# Patient Record
Sex: FEMALE | Race: WHITE | NOT HISPANIC OR LATINO | Employment: FULL TIME | URBAN - METROPOLITAN AREA
[De-identification: names, ages, dates, MRNs, and addresses within clinical notes are randomized per-mention and may not be internally consistent; named-entity substitution may affect disease eponyms.]

---

## 2017-08-30 ENCOUNTER — APPOINTMENT (OUTPATIENT)
Dept: WOMENS IMAGING | Facility: HOSPITAL | Age: 47
End: 2017-08-30

## 2017-08-30 PROCEDURE — G0202 SCR MAMMO BI INCL CAD: HCPCS | Performed by: RADIOLOGY

## 2017-08-30 PROCEDURE — 77067 SCR MAMMO BI INCL CAD: CPT | Performed by: RADIOLOGY

## 2017-08-30 PROCEDURE — MDREVIEWSP: Performed by: RADIOLOGY

## 2017-08-30 PROCEDURE — 77063 BREAST TOMOSYNTHESIS BI: CPT | Performed by: RADIOLOGY

## 2018-11-20 ENCOUNTER — APPOINTMENT (OUTPATIENT)
Dept: WOMENS IMAGING | Facility: HOSPITAL | Age: 48
End: 2018-11-20

## 2018-11-20 PROCEDURE — 77067 SCR MAMMO BI INCL CAD: CPT | Performed by: RADIOLOGY

## 2018-11-20 PROCEDURE — MDREVIEWSP: Performed by: RADIOLOGY

## 2018-11-20 PROCEDURE — 77063 BREAST TOMOSYNTHESIS BI: CPT | Performed by: RADIOLOGY

## 2019-12-16 ENCOUNTER — APPOINTMENT (OUTPATIENT)
Dept: WOMENS IMAGING | Facility: HOSPITAL | Age: 49
End: 2019-12-16

## 2019-12-16 PROCEDURE — 77067 SCR MAMMO BI INCL CAD: CPT | Performed by: RADIOLOGY

## 2019-12-16 PROCEDURE — MDREVIEWSP: Performed by: RADIOLOGY

## 2019-12-16 PROCEDURE — 77063 BREAST TOMOSYNTHESIS BI: CPT | Performed by: RADIOLOGY

## 2022-06-08 ENCOUNTER — TELEPHONE (OUTPATIENT)
Dept: NEUROSURGERY | Facility: CLINIC | Age: 52
End: 2022-06-08

## 2022-06-08 ENCOUNTER — OFFICE VISIT (OUTPATIENT)
Dept: NEUROSURGERY | Facility: CLINIC | Age: 52
End: 2022-06-08

## 2022-06-08 VITALS
HEART RATE: 82 BPM | HEIGHT: 66 IN | SYSTOLIC BLOOD PRESSURE: 132 MMHG | DIASTOLIC BLOOD PRESSURE: 90 MMHG | WEIGHT: 226 LBS | BODY MASS INDEX: 36.32 KG/M2 | OXYGEN SATURATION: 98 % | TEMPERATURE: 97.9 F

## 2022-06-08 DIAGNOSIS — M54.41 ACUTE RIGHT-SIDED LOW BACK PAIN WITH RIGHT-SIDED SCIATICA: Primary | ICD-10-CM

## 2022-06-08 DIAGNOSIS — M25.551 RIGHT HIP PAIN: ICD-10-CM

## 2022-06-08 PROCEDURE — 99204 OFFICE O/P NEW MOD 45 MIN: CPT | Performed by: NURSE PRACTITIONER

## 2022-06-08 RX ORDER — ZOLPIDEM TARTRATE 5 MG/1
5-10 TABLET ORAL
COMMUNITY

## 2022-06-08 RX ORDER — METHOCARBAMOL 750 MG/1
TABLET, FILM COATED ORAL
COMMUNITY
Start: 2022-05-29

## 2022-06-08 RX ORDER — LIDOCAINE 50 MG/G
2 PATCH TOPICAL EVERY 24 HOURS
Qty: 60 PATCH | Refills: 1 | Status: SHIPPED | OUTPATIENT
Start: 2022-06-08

## 2022-06-08 RX ORDER — LISINOPRIL 5 MG/1
TABLET ORAL
COMMUNITY
Start: 2022-05-31

## 2022-06-08 RX ORDER — HYDROCODONE BITARTRATE AND ACETAMINOPHEN 10; 325 MG/1; MG/1
TABLET ORAL
COMMUNITY
Start: 2022-06-06

## 2022-06-08 RX ORDER — ROSUVASTATIN CALCIUM 10 MG/1
10 TABLET, COATED ORAL DAILY
COMMUNITY
Start: 2022-01-03

## 2022-06-08 RX ORDER — SEMAGLUTIDE 2.4 MG/.75ML
INJECTION, SOLUTION SUBCUTANEOUS
COMMUNITY
Start: 2022-06-02

## 2022-06-08 RX ORDER — SEMAGLUTIDE 1.7 MG/.75ML
INJECTION, SOLUTION SUBCUTANEOUS
COMMUNITY
Start: 2022-05-21

## 2022-06-08 NOTE — TELEPHONE ENCOUNTER
PT SAW TUCKER IN OFFICE 6/8/22 AND PER TUCKER Return in about 1 month (around 7/8/2022) for Follow-up with [facility] FOLLOWING PT AND XR.

## 2022-06-08 NOTE — PROGRESS NOTES
Subjective   Patient ID: Ana Trotter is a 51 y.o. female is being seen for consultation today at the request of Milvia Garcia PA-C for back pain. She had ACDF with Dr. Sevilla in 2014.     History of Present Illness  She was evaluated last year by Dr. Contreras as well as PAZoey at [facility].  Physical therapy, injection therapy, and surgery were discussed.      She reports chronic low back pain. Last year had increase in LBP with pain into left leg. Went on trip to [place] and took MDP. Did PT and returned to baseline over time. She reports about two weeks ago she had a flare up of low back pain brought on by work out. 2 nights ago while doing some stretches, her pain was worsened. Entire right leg ached yesterday. Warm bath, nightly meds and some better today but still with pain in right buttock, lateral hip, groin, and into the anterior thigh. No left leg symptoms. She reports she is having some difficulty walking long distances due to pain. She reports sitting and leaning forward makes the pain better, but laying down makes it feel the best. She reports standing and walking makes the pain the worst. She reports some numbness and tingling in her shin and spasms in her thigh, usually with walking long distances. She denies any bowel/bladder incontinence or weakness. She takes Robaxin PRN and is taking hydrocodone at night time before bed.  These are prescribed by her PCP.  She did go to physical therapy last year and does not know if it helped but would be willing to go back to therapy at a different place. No prior lumbar surgery. Non smoker. She is on Wegovy- weight loss medication. She has been on for 3 months- 8-9# wt loss.     The following portions of the patient's history were reviewed and updated as appropriate: allergies, current medications, past family history, past medical history, past social history, past surgical history and problem list.    Review of Systems  "  Constitutional: Positive for activity change.   HENT: Negative for congestion.    Eyes: Negative for visual disturbance.   Respiratory: Negative for chest tightness and shortness of breath.    Cardiovascular: Negative for chest pain.   Gastrointestinal: Negative for nausea and vomiting.   Endocrine: Negative for cold intolerance and heat intolerance.   Genitourinary: Negative for difficulty urinating.   Musculoskeletal: Positive for back pain.   Skin: Negative for rash and wound.   Allergic/Immunologic: Negative for environmental allergies.   Neurological: Positive for numbness. Negative for weakness.   Hematological: Does not bruise/bleed easily.   Psychiatric/Behavioral: Positive for sleep disturbance.     Objective     Vitals:    06/08/22 1444   BP: 132/90   Pulse: 82   Temp: 97.9 °F (36.6 °C)   SpO2: 98%   Weight: 103 kg (226 lb)   Height: 166.4 cm (65.5\")     Body mass index is 37.04 kg/m².      Physical Exam  Vitals reviewed.   Constitutional:       Appearance: Normal appearance.      Comments: Body mass index is 37.04 kg/m².     Pulmonary:      Effort: Pulmonary effort is normal.   Musculoskeletal:      Lumbar back: Tenderness ( lower buttock; not much at SI joint) present. No bony tenderness. Negative right straight leg raise test and negative left straight leg raise test.      Right hip: Tenderness ( lateral) present. Normal range of motion.      Comments:   SI joint testing:  Negative YAZ, Gaenslen, thigh thrust, compression, distraction; has pain in anterior thigh with Gaenslen and lateral hip and groin with YAZ   Neurological:      General: No focal deficit present.      Mental Status: She is alert.      Deep Tendon Reflexes:      Reflex Scores:       Patellar reflexes are 1+ on the right side and 2+ on the left side.       Achilles reflexes are 2+ on the right side and 2+ on the left side.  Psychiatric:         Mood and Affect: Mood normal.         Speech: Speech normal.       Neurologic Exam "     Mental Status   Speech: speech is normal   Level of consciousness: alert  Knowledge: good.   Normal comprehension.     Motor Exam   Muscle bulk: normal  Overall muscle tone: normal  5/5 angie LEs     Sensory Exam   Right leg light touch: normal  Left leg light touch: normal    Gait, Coordination, and Reflexes     Gait  Gait: (forward flexed)    Reflexes   Right patellar: 1+  Left patellar: 2+  Right achilles: 2+  Left achilles: 2+  Right ankle clonus: absent  Left ankle clonus: absent      Assessment & Plan   Independent Review of Radiographic Studies:      I personally reviewed the images from the following studies.    MRI lumbar spine by report from [facility] 5/6/2021 reveals mild facet arthropathy at multiple levels.  At L4/5 there is facet arthropathy with ligamentum flavum hypertrophy and central disc bulging.  There is right lateral disc herniation into the foramen resulting in mild right canal and lateral recess stenosis and some compression of the right L4 nerve.  At L5/S1 there is severe facet arthropathy, left-sided synovial cyst causing compression of the L5 nerve and mild anterolisthesis of L5 on S1.    Medical Decision Making:      Patient presents for evaluation of acute on chronic low back pain.  Over the last 2 weeks she has been experiencing increased severity of right low back, buttock, lateral hip, and anterior thigh pain.  Last year she was evaluated by other providers for similar symptoms but on the left.  She was treated conservatively with physical therapy and steroid dosing and improved slowly over time.  States her current symptoms began after her workout and were exacerbated by additional stretching.  On exam she has no weakness or sensory changes.  Reduced reflex right knee jerk but negative straight leg raise.  She has tenderness in the right buttock lateral hip and anterior thigh but SI joint testing overall unremarkable.  She denies any specific trauma.  She has no neurologic  red flags, I think the best thing would be begin with some conservative management including x-rays of lumbar spine and pelvis including right hip.  I will contact her tomorrow after review the imaging.  She is interested in conservative management with surgical intervention being a very last resort.  I do not think she has anything surgical at the time, but which she needs continued follow-up.  I will order PT and we will discuss steroids tomorrow after imaging review.  She will follow-up in 1 month and notify us in the meantime if she has any progression of symptoms.  She states that over-the-counter Bengay helps and would be interested in Lidoderm patch.  I have ordered.  PCP manages her nighttime dose of Norco and as needed Robaxin.    Diagnoses and all orders for this visit:    1. Acute right-sided low back pain with right-sided sciatica (Primary)  -     XR spine lumbar flex and ext; Future  -     Ambulatory Referral to Physical Therapy Evaluate and treat    2. Right hip pain  -     XR hip w or wo pelvis 2-3 view right; Future  -     Ambulatory Referral to Physical Therapy Evaluate and treat    Other orders  -     lidocaine (LIDODERM) 5 %; Place 2 patches on the skin as directed by provider Daily. Remove & Discard patch within 12 hours or as directed by MD  Dispense: 60 patch; Refill: 1      Return in about 1 month (around 7/8/2022) for Follow-up with [facility].

## 2022-06-09 ENCOUNTER — TELEPHONE (OUTPATIENT)
Dept: NEUROSURGERY | Facility: CLINIC | Age: 52
End: 2022-06-09

## 2022-06-09 NOTE — TELEPHONE ENCOUNTER
I called the patient to ask when she was going to get her x-rays completed. There was no answer and her mailbox was full and I could not leave a message.

## 2022-06-14 NOTE — TELEPHONE ENCOUNTER
I spoke with Ms. Trotter she reported she had to have the Xrays completed with [facility] as [facility] is not in her insurance network. She reported she is unable to keep following up with us due to insurance issues and we are tier three in her network and she cannot afford it. She reports she is wanting recommendations for other neurosurgeons and  Is thinking about following up with Dr. Pettit with [facility]. She states she did start physical therapy and it has seemed to help already.

## 2022-06-14 NOTE — TELEPHONE ENCOUNTER
I called and LVM for the patient to ask about when she plans to get her XRs completed or if we need to send the order over to [facility].

## 2022-06-14 NOTE — TELEPHONE ENCOUNTER
Please let her know that I completely understand.  Financial aspect plays a major role.  I think follow-up with Dr. Killian. is appropriate.  I am glad to hear that physical therapy is helping.  I did look in the [facility] information and by report, does not appear that there is any acute issues in her pelvis, right hip, or lumbar spine.  She has some mild DDD at L4/5.

## 2024-04-26 ENCOUNTER — TELEPHONE (OUTPATIENT)
Dept: NEUROSURGERY | Facility: CLINIC | Age: 54
End: 2024-04-26
Payer: COMMERCIAL

## 2024-04-26 NOTE — TELEPHONE ENCOUNTER
I called and LVM for patient to call back and get scheduled with Dr. Sevilla. First avail FU.     FU with cervical MRI- IN CHART    [facility] ok to schedule.

## 2024-04-29 NOTE — PROGRESS NOTES
"Subjective   History of Present Illness: Ana Trotter is a 53 y.o. female is here today for follow-up.    Today, Ms. Trotter reports neck pain that radiates into the left arm with numbness and tingling.  Recall she was a former patient of mine who an anterior cervical discectomy fusion at C5-C6 in the distant past.  She was not having any issues with her neck until about a month ago.  This began a few weeks ago when she was moving she thinks she may have overdone it.  She also changed some of her activities with her dog and she got a beanbag chair that she gets up and down from with the dog and felt like she was pushing off of the left arm and aggravated her left shoulder.  The tingling extends from the neck down the arm to the thumb on the left.  She denies any weakness.  Symptoms quite severe for about a week and now it is just intermittent but significant.  She did get a physical therapy evaluation yesterday but did not start any treatment yet    History of Present Illness    Tobacco Use: Low Risk  (4/30/2024)    Patient History     Smoking Tobacco Use: Never     Smokeless Tobacco Use: Never     Passive Exposure: Not on file   Recent Concern: Tobacco Use - Medium Risk (3/14/2024)    Received from [facility]    Patient History     Smoking Tobacco Use: Never     Smokeless Tobacco Use: Former     Passive Exposure: Not on file        The following portions of the patient's history were reviewed and updated as appropriate: allergies, current medications, past family history, past medical history, past social history, past surgical history and problem list.    Review of Systems    Objective     Vitals:    04/30/24 0802   BP: 126/84   Weight: 103 kg (226 lb)   Height: 166.4 cm (65.5\")     Body mass index is 37.04 kg/m².      Physical Exam  Neurologic Exam    Physical Exam:    CONSTITUTIONAL: This 53 year old  female appears well developed, well-nourished and in no acute distress.    HEAD & FACE: the head and " face are symmetric, normocephalic and atraumatic.    EYES: Inspection of the conjunctivae and lids reveals no swelling, erythema or discharge.  Pupils are round, equal and reactive to light and there is no scleral icterus.    EARS, NOSE, MOUTH & THROAT: On inspection, the ears and nose are within normal limits.    NECK: the neck is supple and symmetric. The trachea is midline with no masses.  Range of motion of the neck reveals she has discomfort with lateral bending to the left and extension but has good range of motion overall    PULMONARY: Respiratory effort is normal with no increased work of breathing or signs of respiratory distress.    CARDIOVASCULAR: Pedal pulses are +2/4 bilaterally. Examination of the extremities shows no edema or varicosities.    MUSCULOSKELETAL: Gait and station are within normal limits. The spine has normal alignment and range of motion.    SKIN: The skin is warm, dry and intact, left anterior cervical incision is healed well    NEUROLOGIC:   Cranial Nerves 2-12 intact  Normal motor strength noted. Muscle bulk and tone are normal.  Sensory exam is normal to fine touch to confrontational testing bilaterally  Reflexes on the right side demonstrates 1/4 Triceps Reflex, 1/4 Biceps Reflex, 1/4 Brachioradialis Reflex, 2/4 Knee Jerk Reflex, 1/4 Ankle Jerk Reflex and no ankle clonus on the right.   Reflexes on the left side demonstrates 1/4 Triceps Reflex, 1/4 Biceps Reflex, 1/4 Brachioradialis Reflex, 2/4 Knee Jerk Reflex, 1/4 Ankle Jerk Reflex and no ankle clonus on the left.  Superficial/Primitive Reflexes: primitive reflexes were absent.  Palm's, Babinski, and Clonus signs all negative.  No coordination deficit observed.  Radicular testing showed a negative Spurling's maneuver  Cortical function is intact and without deficits. Speech is normal.    PSYCHIATRIC: oriented to person, place and time. Patient's mood and affect are normal.    Assessment & Plan   Independent Review of  Radiographic Studies:      I personally reviewed the images from the following studies.    MRI of the cervical spine done with and without contrast on April 22, 2024 documents anterior fusion C5-C6 with decompression of the central canal and some residual neuroforaminal narrowing difficult to assess due to metallic artifact.  Spondylosis at C4-C5 suggest left greater than right neuroforaminal narrowing at that level.  At C3-C4 there is left greater than right neuroforaminal narrowing.  C6-C7 moderate neuroforaminal narrowing is suspected bilaterally.          Medical Decision Making:      Patient has primarily neck and left shoulder pain with some radicular complaints but no overt loss of neurologic function.  Given her pattern of symptoms and the fact that they have improved and the chronic findings on the MRI I think she should respond to conservative management including physical therapy and a Medrol Dosepak.  I will see her back in 3 to 4 weeks to make sure she improves.  If she continues to have improvement but still having discomfort she might be a good candidate for interventional pain management techniques.    Return in about 4 weeks (around 5/28/2024) for discussion of Physical Therapy results.    Diagnoses and all orders for this visit:    1. Neck pain (Primary)  -     Ambulatory Referral to Physical Therapy for Evaluation & Treatment    2. Cervical radiculitis  -     Ambulatory Referral to Physical Therapy for Evaluation & Treatment    3. H/O neck surgery  -     Ambulatory Referral to Physical Therapy for Evaluation & Treatment    Other orders  -     methylPREDNISolone (MEDROL) 4 MG dose pack; Take as directed on package instructions.  Dispense: 1 each; Refill: 0             Kenroy Sevilla MD [facility]  Neurological Surgery

## 2024-04-29 NOTE — TELEPHONE ENCOUNTER
Hub staff attempted to follow warm transfer process and was unsuccessful     Caller: Ana Trtoter    Relationship to patient: Self    Best call back number: [phone]    Patient is needing: PATIENT CALLED BACK-AND STATES SHE WOULD LIKE THE 8 A.M. SPOT TO SEE -PT STATES THAT SHE IS IN AND OUT OF CASES ALL DAY AND STATES OK FOR DONG TO [facility] FOR HER TO ADVISE-CALLED AND SPOKE WITH ISAIAS BUT DONG WAS UNAVAILABLE SENDING TO OFFICE TO ADVISE THANK YOU

## 2024-04-29 NOTE — TELEPHONE ENCOUNTER
I called and LVM for patient letting her know that Dr. Sevilla's schedule stops at 1 tomorrow. HE can see her between 12-1 and at 8 am if she would like.     [facility] can advise. Please route back to office

## 2024-04-30 ENCOUNTER — OFFICE VISIT (OUTPATIENT)
Dept: NEUROSURGERY | Facility: CLINIC | Age: 54
End: 2024-04-30
Payer: COMMERCIAL

## 2024-04-30 VITALS
HEIGHT: 66 IN | BODY MASS INDEX: 36.32 KG/M2 | SYSTOLIC BLOOD PRESSURE: 126 MMHG | DIASTOLIC BLOOD PRESSURE: 84 MMHG | WEIGHT: 226 LBS

## 2024-04-30 DIAGNOSIS — M54.2 NECK PAIN: Primary | ICD-10-CM

## 2024-04-30 DIAGNOSIS — Z98.890 H/O NECK SURGERY: ICD-10-CM

## 2024-04-30 DIAGNOSIS — M54.12 CERVICAL RADICULITIS: ICD-10-CM

## 2024-04-30 PROCEDURE — 99213 OFFICE O/P EST LOW 20 MIN: CPT | Performed by: NEUROLOGICAL SURGERY

## 2024-04-30 RX ORDER — TIRZEPATIDE 15 MG/.5ML
INJECTION, SOLUTION SUBCUTANEOUS
COMMUNITY

## 2024-04-30 RX ORDER — AMLODIPINE BESYLATE 5 MG/1
TABLET ORAL
COMMUNITY
Start: 2023-12-26

## 2024-04-30 RX ORDER — METHYLPREDNISOLONE 4 MG/1
TABLET ORAL
Qty: 1 EACH | Refills: 0 | Status: SHIPPED | OUTPATIENT
Start: 2024-04-30